# Patient Record
Sex: FEMALE | Race: WHITE | Employment: OTHER | URBAN - NONMETROPOLITAN AREA
[De-identification: names, ages, dates, MRNs, and addresses within clinical notes are randomized per-mention and may not be internally consistent; named-entity substitution may affect disease eponyms.]

---

## 2017-01-05 DIAGNOSIS — Z12.31 VISIT FOR SCREENING MAMMOGRAM: Primary | ICD-10-CM

## 2017-01-10 DIAGNOSIS — F41.9 ANXIETY: Primary | ICD-10-CM

## 2017-01-10 RX ORDER — LORAZEPAM 0.5 MG/1
TABLET ORAL
Qty: 30 TABLET | Refills: 0 | Status: SHIPPED | OUTPATIENT
Start: 2017-01-10 | End: 2017-02-13

## 2017-01-10 NOTE — TELEPHONE ENCOUNTER
ativan      Last Written Prescription Date: 11/22/16  Last Fill Quantity: 30,  # refills: 0   Last Office Visit with G, [facility] or [facility] prescribing provider: 11/22/16

## 2017-02-01 DIAGNOSIS — G47.00 PERSISTENT INSOMNIA: Primary | ICD-10-CM

## 2017-02-02 NOTE — TELEPHONE ENCOUNTER
Amitriptyline 25mg tab      Last Written Prescription Date: 10-  Last Quantity: 180, # refills: 4  Last Office Visit with [facility], [facility] or [facility] prescribing provider: 11-       CREATININE   Date Value Ref Range Status   10/04/2016 0.94 0.52 - 1.04 mg/dL Final     AST       23   10/4/2016  ALT       24   10/4/2016  BP Readings from Last 3 Encounters:   11/22/16 128/62   11/02/16 172/88   10/14/16 122/62

## 2017-02-09 DIAGNOSIS — G47.00 PERSISTENT INSOMNIA: Primary | ICD-10-CM

## 2017-02-09 RX ORDER — AMITRIPTYLINE HYDROCHLORIDE 50 MG/1
50 TABLET ORAL 2 TIMES DAILY
Qty: 180 TABLET | Refills: 0 | Status: SHIPPED | OUTPATIENT
Start: 2017-02-09

## 2017-03-22 DIAGNOSIS — F41.9 ANXIETY: ICD-10-CM

## 2017-03-23 RX ORDER — LORAZEPAM 0.5 MG/1
TABLET ORAL
Qty: 30 TABLET | Refills: 0 | Status: SHIPPED | OUTPATIENT
Start: 2017-03-23 | End: 2017-03-31

## 2017-03-23 NOTE — TELEPHONE ENCOUNTER
lorazepam      Last Written Prescription Date: 2/14/17  Last Fill Quantity: 30,  # refills: 0   Last Office Visit with G, [facility] or [facility] prescribing provider: 11/22/16

## 2017-03-28 ENCOUNTER — TELEPHONE (OUTPATIENT)
Dept: INTERNAL MEDICINE | Facility: OTHER | Age: 82
End: 2017-03-28

## 2017-03-28 NOTE — TELEPHONE ENCOUNTER
Reason for call:  Medication    1. Medication Name? LORazepam (ATIVAN) 0.5 MG tablet  2. Is this request for a refill? Yes  3. What Pharmacy do you use? [facility] in [facility]   4. Have you contacted your pharmacy? No    5. If yes, when?  (Please note that the turn-around-time for prescriptions is 72 business hours; I am sending your request at this time. SEND TO  [facility]  )  Description: Pt called and had some questions about her refills on this would like a call back.  Was an appointment offered for this a call? No   Preferred method for responding to this messageTelephone Clin-[phone]  If we cannot reach you directly, may we leave a detailed response at the number you provided? Yes  Can this message wait until your PCP/Provider returns if not available today? Yes

## 2017-03-29 NOTE — TELEPHONE ENCOUNTER
Cale, are you refilling Roxanne's medication ( Ativan)?  She moved to [place].  Last office call was in November 2016

## 2017-03-29 NOTE — TELEPHONE ENCOUNTER
Patient has an appointment with internal med in April.  It took months to her in. [facility] only takes 4 new patients a year.  She wants you to know that she has not had a UTI since she moved.  She requested a copy of her Ct guided injection from June. This was sent.

## 2017-03-31 DIAGNOSIS — F41.9 ANXIETY: ICD-10-CM

## 2017-04-04 RX ORDER — LORAZEPAM 0.5 MG/1
TABLET ORAL
Qty: 30 TABLET | Refills: 0 | Status: SHIPPED | OUTPATIENT
Start: 2017-04-04

## 2017-04-04 NOTE — TELEPHONE ENCOUNTER
ativan      Last Written Prescription Date: 3/23/17  Last Fill Quantity: 30,  # refills: 0   Last Office Visit with G, [facility] or [facility] prescribing provider: 11/22/16

## 2017-04-06 ENCOUNTER — TELEPHONE (OUTPATIENT)
Dept: INTERNAL MEDICINE | Facility: OTHER | Age: 82
End: 2017-04-06

## 2017-04-06 NOTE — TELEPHONE ENCOUNTER
Reason for call:  Medication    1. Medication Name? Indomethacin 50 mg  2. Is this request for a refill? Yes  3. What [facility] do you [facility]? [facility]  4. Have you contacted your pharmacy? Yes    5. If yes, when?  (Please note that the turn-around-time for prescriptions is 72 business hours; I am sending your request at this time. SEND TO  [facility]  )  Description: Kat from [facility] is wanting a refill on Indomethacin 50 mg  Was an appointment offered for this a call? No   Preferred method for responding to this messagemed refill/[phone]  If we cannot reach you directly, may we leave a detailed response at the number you provided? No  Can this message wait until your PCP/Provider returns if not available today? No

## 2017-04-07 ENCOUNTER — TELEPHONE (OUTPATIENT)
Dept: INTERNAL MEDICINE | Facility: OTHER | Age: 82
End: 2017-04-07

## 2017-04-07 NOTE — TELEPHONE ENCOUNTER
Received phone call from [facility] pharmacy about patient's indocin. They didn't receive prescription. I gave verbal to fill Indocin 50 mg take one capsule bid until gone. Take with meals and no refills. [facility] pharmacist took order and will fill them asap.  Erika Boyer LPN

## 2017-04-25 DIAGNOSIS — I10 ESSENTIAL HYPERTENSION: ICD-10-CM

## 2017-04-27 RX ORDER — POTASSIUM CHLORIDE 1500 MG/1
TABLET, EXTENDED RELEASE ORAL
Qty: 90 TABLET | Refills: 0 | Status: SHIPPED | OUTPATIENT
Start: 2017-04-27

## 2017-06-17 DIAGNOSIS — R60.0 PERIPHERAL EDEMA: ICD-10-CM

## 2017-06-19 ENCOUNTER — TELEPHONE (OUTPATIENT)
Dept: INTERNAL MEDICINE | Facility: OTHER | Age: 82
End: 2017-06-19

## 2017-06-19 NOTE — TELEPHONE ENCOUNTER
3:16 PM    Reason for Call: Phone Call    Description: [facility] calling and wanting copies of pt CT scans. Pt told them she has one once a year. Please call the as they have questions. [phone] can talk to any nurse. Param is the provider.    Was an appointment offered for this call? No    Preferred method for responding to this message: Telephone Call    If we cannot reach you directly, may we leave a detailed response at the number you provided? Yes    Can this message wait until your PCP/provider returns, if available today?     Shantel Waldrop

## 2017-06-19 NOTE — TELEPHONE ENCOUNTER
Please forward this to release of information or have them contact release of information for their medical records.  Thank you.

## 2017-06-20 NOTE — TELEPHONE ENCOUNTER
Lasix      Last Written Prescription Date: 3/22/2017  Last Fill Quantity: 90, # refills: 0  Last Office Visit with [facility], [facility] or [facility] prescribing provider: 11/22/2016       Potassium   Date Value Ref [facility] Status   10/04/2016 3.7 3.4 - 5.3 mmol/L Final     Creatinine   Date Value Ref Range Status   10/04/2016 0.94 0.52 - 1.04 mg/dL Final     BP Readings from Last 3 Encounters:   11/22/16 128/62   11/02/16 172/88   10/14/16 122/62

## 2017-06-21 RX ORDER — FUROSEMIDE 20 MG
TABLET ORAL
Qty: 90 TABLET | Refills: 1 | Status: SHIPPED | OUTPATIENT
Start: 2017-06-21

## 2017-07-22 DIAGNOSIS — R69 DIAGNOSIS UNKNOWN: ICD-10-CM

## 2017-07-24 NOTE — TELEPHONE ENCOUNTER
Elavil      Last Written Prescription Date: 2/2/17  Last Quantity: 180, # refills: 0  [facility] with [facility] or [facility] prescribing provider: 11/22/16       Creatinine   Date Value Ref Range Status   10/04/2016 0.94 0.52 - 1.04 mg/dL Final     Lab Results   Component Value Date    AST 23 10/04/2016     Lab Results   Component Value Date    ALT 24 10/04/2016     BP Readings from Last 3 Encounters:   11/22/16 128/62   11/02/16 172/88   10/14/16 122/62